# Patient Record
Sex: FEMALE | Race: WHITE | URBAN - METROPOLITAN AREA
[De-identification: names, ages, dates, MRNs, and addresses within clinical notes are randomized per-mention and may not be internally consistent; named-entity substitution may affect disease eponyms.]

---

## 2022-01-13 ENCOUNTER — OFFICE VISIT (OUTPATIENT)
Dept: URBAN - METROPOLITAN AREA CLINIC 16 | Facility: CLINIC | Age: 72
End: 2022-01-13
Payer: MEDICARE

## 2022-01-13 DIAGNOSIS — H25.13 AGE-RELATED NUCLEAR CATARACT, BILATERAL: Primary | ICD-10-CM

## 2022-01-13 DIAGNOSIS — H11.442 CONJUNCTIVAL CYSTS, LEFT EYE: ICD-10-CM

## 2022-01-13 PROCEDURE — 92004 COMPRE OPH EXAM NEW PT 1/>: CPT | Performed by: OPTOMETRIST

## 2022-01-13 ASSESSMENT — KERATOMETRY
OS: 44.38
OD: 24.63

## 2022-01-13 ASSESSMENT — INTRAOCULAR PRESSURE
OD: 14
OS: 14

## 2022-01-13 NOTE — IMPRESSION/PLAN
Impression: Conjunctival cysts, left eye: H11.442. Plan: Discussed condition w/pt. Refer for consult w/[facility], next available.